# Patient Record
Sex: MALE | Race: WHITE | Employment: OTHER
[De-identification: names, ages, dates, MRNs, and addresses within clinical notes are randomized per-mention and may not be internally consistent; named-entity substitution may affect disease eponyms.]

---

## 2022-03-18 PROBLEM — R60.0 LOCALIZED EDEMA: Status: ACTIVE | Noted: 2021-04-30

## 2022-03-18 PROBLEM — E66.01 SEVERE OBESITY (HCC): Status: ACTIVE | Noted: 2020-01-03

## 2022-03-19 PROBLEM — N13.8 BPH WITH OBSTRUCTION/LOWER URINARY TRACT SYMPTOMS: Status: ACTIVE | Noted: 2021-07-16

## 2022-03-19 PROBLEM — H90.3 BILATERAL SENSORINEURAL HEARING LOSS: Status: ACTIVE | Noted: 2020-02-14

## 2022-03-19 PROBLEM — R36.1: Status: ACTIVE | Noted: 2021-07-16

## 2022-03-19 PROBLEM — L85.1 ACQUIRED KERATODERMA: Status: ACTIVE | Noted: 2020-01-20

## 2022-03-19 PROBLEM — N40.1 BPH WITH OBSTRUCTION/LOWER URINARY TRACT SYMPTOMS: Status: ACTIVE | Noted: 2021-07-16

## 2022-03-20 PROBLEM — N18.31 STAGE 3A CHRONIC KIDNEY DISEASE (HCC): Status: ACTIVE | Noted: 2020-02-14

## 2022-07-18 DIAGNOSIS — E78.00 PURE HYPERCHOLESTEROLEMIA, UNSPECIFIED: ICD-10-CM

## 2022-07-18 RX ORDER — ROSUVASTATIN CALCIUM 5 MG/1
TABLET, COATED ORAL
Qty: 90 TABLET | Refills: 0 | Status: SHIPPED | OUTPATIENT
Start: 2022-07-18 | End: 2022-08-04 | Stop reason: SDUPTHER

## 2022-07-22 ENCOUNTER — TELEPHONE (OUTPATIENT)
Dept: FAMILY MEDICINE CLINIC | Facility: CLINIC | Age: 72
End: 2022-07-22

## 2022-07-25 DIAGNOSIS — E78.00 HYPERCHOLESTEROLEMIA: Primary | ICD-10-CM

## 2022-07-29 ENCOUNTER — NURSE ONLY (OUTPATIENT)
Dept: FAMILY MEDICINE CLINIC | Facility: CLINIC | Age: 72
End: 2022-07-29

## 2022-07-29 DIAGNOSIS — E78.00 HYPERCHOLESTEROLEMIA: ICD-10-CM

## 2022-07-29 LAB
ALT SERPL-CCNC: 22 U/L (ref 12–65)
CHOLEST SERPL-MCNC: 95 MG/DL
HDLC SERPL-MCNC: 32 MG/DL (ref 40–60)
HDLC SERPL: 3 {RATIO}
LDLC SERPL CALC-MCNC: 43.6 MG/DL
TRIGL SERPL-MCNC: 97 MG/DL (ref 35–150)
VLDLC SERPL CALC-MCNC: 19.4 MG/DL (ref 6–23)

## 2022-08-04 ENCOUNTER — NURSE ONLY (OUTPATIENT)
Dept: FAMILY MEDICINE CLINIC | Facility: CLINIC | Age: 72
End: 2022-08-04

## 2022-08-04 ENCOUNTER — OFFICE VISIT (OUTPATIENT)
Dept: FAMILY MEDICINE CLINIC | Facility: CLINIC | Age: 72
End: 2022-08-04
Payer: COMMERCIAL

## 2022-08-04 VITALS
BODY MASS INDEX: 34.03 KG/M2 | RESPIRATION RATE: 18 BRPM | WEIGHT: 216.8 LBS | TEMPERATURE: 98.1 F | OXYGEN SATURATION: 97 % | HEART RATE: 63 BPM | SYSTOLIC BLOOD PRESSURE: 118 MMHG | HEIGHT: 67 IN | DIASTOLIC BLOOD PRESSURE: 69 MMHG

## 2022-08-04 DIAGNOSIS — R73.09 ELEVATED GLUCOSE: ICD-10-CM

## 2022-08-04 DIAGNOSIS — R11.0 NAUSEA: ICD-10-CM

## 2022-08-04 DIAGNOSIS — E66.09 CLASS 1 OBESITY DUE TO EXCESS CALORIES WITH SERIOUS COMORBIDITY AND BODY MASS INDEX (BMI) OF 33.0 TO 33.9 IN ADULT: ICD-10-CM

## 2022-08-04 DIAGNOSIS — E78.00 HYPERCHOLESTEROLEMIA: ICD-10-CM

## 2022-08-04 DIAGNOSIS — B35.6 TINEA CRURIS: Primary | ICD-10-CM

## 2022-08-04 PROCEDURE — 99214 OFFICE O/P EST MOD 30 MIN: CPT | Performed by: FAMILY MEDICINE

## 2022-08-04 PROCEDURE — 1123F ACP DISCUSS/DSCN MKR DOCD: CPT | Performed by: FAMILY MEDICINE

## 2022-08-04 RX ORDER — ROSUVASTATIN CALCIUM 5 MG/1
TABLET, COATED ORAL
Qty: 90 TABLET | Refills: 2 | Status: SHIPPED | OUTPATIENT
Start: 2022-08-04

## 2022-08-04 RX ORDER — NYSTATIN 100000 [USP'U]/G
POWDER TOPICAL
Qty: 60 G | Refills: 5 | Status: SHIPPED | OUTPATIENT
Start: 2022-08-04

## 2022-08-04 RX ORDER — FLUCONAZOLE 150 MG/1
TABLET ORAL
Qty: 7 TABLET | Refills: 0 | Status: SHIPPED | OUTPATIENT
Start: 2022-08-04

## 2022-08-04 NOTE — PROGRESS NOTES
[address] [facility] [address]  Phone: [phone] Fax [phone]  Kike Falcon. Houston Maurer M.D.  Rosa Quintero is a 71 y.o. male     HPI:  Patient is seen for Skin Problem (Itching issue, no rash, in groin area, going on for many years, )  Patient describes history of groin itching for 4 to 5 years. He has tried numerous OTC topical preparations without noted benefit. He has tried topical hydrocortisone as well as antibiotic without noted benefit. States that it will itch such that he will claw at it at times. Patient did come in fasting July 29 for lipid panel and ALT. He tolerated the Crestor 5 mg daily which she continues. Chart review does show that in April when he had fasting labs drawn his blood sugar at that time was 162. States that he has had a number of family members including his father and father's relatives with diabetes. 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 Has known sensorineural bilateral hearing loss. States the nausea is why he stopped working at age 47. ROS:  Denies any chest pain dyspnea diaphoresis or edema. No reported palpitations or tachycardia. No polydipsia or polyphagia or polyuria. No abdominal pain. No hematochezia melena diarrhea or constipation. No dysuria or hematuria. Denies any depression.      Allergies   Allergen Reactions    Penicillins Hives    Pravastatin Other (See Comments)     Leg cramps    Sulfa Antibiotics Other (See Comments)       Active Ambulatory Problems     Diagnosis Date Noted    Severe obesity ([address] 01/03/2020    Localized edema 04/30/2021    Tear of medial meniscus of right knee 01/19/2016    Bilateral sensorineural hearing loss 02/14/2020    DANY (obstructive sleep apnea) 03/31/2014    BPH with obstruction/lower urinary tract symptoms 07/16/2021    Hypercholesterolemia 03/31/2014 Cervical stenosis of spine     Acquired keratoderma 01/20/2020    Ejaculation blood 07/16/2021    Right knee pain 01/19/2016    Stage 3a chronic kidney disease ([address] 02/14/2020    Elevated glucose 08/04/2022    Tinea cruris 08/04/2022     Resolved Ambulatory Problems     Diagnosis Date Noted    No Resolved Ambulatory Problems     Past Medical History:   Diagnosis Date    Abnormal blood chemistry 2/23/2016    Chest discomfort 2/23/2016    Concussion 1984    Hyperlipidemia 2/23/2016    Obesity 2/23/2016        Past Surgical History:   Procedure Laterality Date    COLONOSCOPY  February 2011    Dr. Sohail Villa in [place]; reportedly normal    NEUROLOGICAL SURGERY  1987    Herniated disc    ORTHOPEDIC SURGERY Left 10/05/2021    Dr. Andrew Becerra; [employer]'s release    ORTHOPEDIC SURGERY Right surgery in 73, 74, 76, and 80    broken leg/fused ankle    TOTAL KNEE ARTHROPLASTY Left 03/03/2020    Dr. Bianca Barrios      Family History   Problem Relation Age of Onset    Diabetes Father     Heart Disease Father     Cancer Father         skin    Heart Disease Maternal Grandmother     Heart Disease Maternal Grandfather     Heart Disease Paternal Grandmother     Heart Disease Paternal Grandfather     Cancer Paternal Aunt         stomach       Social History     Tobacco Use    Smoking status: Never    Smokeless tobacco: Never   Substance Use Topics    Alcohol use: No    Drug use: No       Physical Exam:  Blood pressure 118/69, pulse 63, temperature 98.1 °F (36.7 °C), temperature source Temporal, resp. rate 18, height 5' 7.13\" (1.705 m), weight 216 lb 12.8 oz (98.3 kg), SpO2 97 %. Pleasant male in no apparent distress. Affect is appropriate. HEENT grossly normal.  Oral mucosa is moist and intact. Neck is fairly supple. No cervical lymphadenopathy or thyromegaly or nodularity. Lungs clear. No JVD or hepatojugular reflux. Cardiovascular regular S1-S2 without murmurs rubs or gallops. Radial pulses 2+.   Abdomen is soft, moderately obese, and nontender with positive bowel sounds. No masses palpated. No peripheral edema or cyanosis. Moves all extremities well. Intertriginous areas of the groin as well as the scrotum are erythematous and mild to moderately excoriated. Assessment and Plan:   Diagnosis Orders   1. Tinea cruris  nystatin (MYCOSTATIN) 697695 UNIT/GM powder    fluconazole (DIFLUCAN) 150 MG tablet      2. Elevated glucose  Hemoglobin A1C      3. Hypercholesterolemia  rosuvastatin (CRESTOR) 5 MG tablet      4. Nausea        5. Class 1 obesity due to excess calories with serious comorbidity and body mass index (BMI) of 33.0 to 33.9 in adult          Information on tinea cruris, hyperlipidemia, prediabetes, and low carbohydrate diet provided. With family history of diabetes and noted elevated glucose in April as well as recurrent tinea cruris, check A1c level today. Placed patient on oral Diflucan daily for 7 days and topical nystatin powder as needed with history of recurrent tinea cruris. Reviewed with patient his significant improvement of cholesterol on Crestor 5 mg daily which was refilled. He had come in for labs late last month fasting. Commended patient on his weight loss from previous with dietary changes reported. May need to consider referral back to ENT physician if worsening nausea associated with positional change such as lying on his back or bending over to work on cars. Reassess here next April fasting and for subsequent wellness exam as planned. Discharge Meds:  Current Outpatient Medications   Medication Sig Dispense Refill    rosuvastatin (CRESTOR) 5 MG tablet TAKE 1 TABLET BY MOUTH NIGHTLY 90 tablet 2    VITAMIN D PO Take by mouth      Cyanocobalamin (VITAMIN B12 PO) Take by mouth      nystatin (MYCOSTATIN) 396682 UNIT/GM powder Apply topically 4 times daily.  60 g 5    fluconazole (DIFLUCAN) 150 MG tablet 1 tab orally daily for 7 days 7 tablet 0    GARLIC PO Take by mouth      MAGNESIUM PO Take by mouth No current facility-administered medications for this visit. Return in about 9 months (around 4/24/2023) for fasting and sub wellness as planned. Any new medications were explained today including any potential drug interactions or significant side effects. The patient's questions in regards to this were answered today. Dictated using voice recognition software.   Proofread, but unrecognized errors may exist.

## 2022-08-05 LAB
EST. AVERAGE GLUCOSE BLD GHB EST-MCNC: 137 MG/DL
HBA1C MFR BLD: 6.4 % (ref 4.8–5.6)

## 2022-10-18 ENCOUNTER — TELEPHONE (OUTPATIENT)
Dept: FAMILY MEDICINE CLINIC | Facility: CLINIC | Age: 72
End: 2022-10-18

## 2022-10-18 NOTE — TELEPHONE ENCOUNTER
Pt called stating that he has had a cough 5-6 weeks now. Asking if something can be called into the pharmacy for him. Last OV 8/4/22.  Next OV 4/24/23

## 2023-04-24 ENCOUNTER — NURSE ONLY (OUTPATIENT)
Dept: FAMILY MEDICINE CLINIC | Facility: CLINIC | Age: 73
End: 2023-04-24

## 2023-04-24 ENCOUNTER — OFFICE VISIT (OUTPATIENT)
Dept: FAMILY MEDICINE CLINIC | Facility: CLINIC | Age: 73
End: 2023-04-24
Payer: COMMERCIAL

## 2023-04-24 VITALS
OXYGEN SATURATION: 95 % | TEMPERATURE: 97.2 F | WEIGHT: 225 LBS | DIASTOLIC BLOOD PRESSURE: 77 MMHG | HEART RATE: 59 BPM | RESPIRATION RATE: 18 BRPM | SYSTOLIC BLOOD PRESSURE: 132 MMHG | HEIGHT: 67 IN | BODY MASS INDEX: 35.31 KG/M2

## 2023-04-24 DIAGNOSIS — E78.00 HYPERCHOLESTEROLEMIA: ICD-10-CM

## 2023-04-24 DIAGNOSIS — N18.31 CHRONIC KIDNEY DISEASE, STAGE 3A (HCC): ICD-10-CM

## 2023-04-24 DIAGNOSIS — R63.4 WEIGHT LOSS: ICD-10-CM

## 2023-04-24 DIAGNOSIS — R60.0 LOCALIZED EDEMA: ICD-10-CM

## 2023-04-24 DIAGNOSIS — R32 URINARY INCONTINENCE, UNSPECIFIED TYPE: ICD-10-CM

## 2023-04-24 DIAGNOSIS — H90.3 BILATERAL SENSORINEURAL HEARING LOSS: ICD-10-CM

## 2023-04-24 DIAGNOSIS — R35.1 BENIGN PROSTATIC HYPERPLASIA WITH NOCTURIA: ICD-10-CM

## 2023-04-24 DIAGNOSIS — N40.1 BENIGN PROSTATIC HYPERPLASIA WITH NOCTURIA: ICD-10-CM

## 2023-04-24 DIAGNOSIS — R73.09 ELEVATED GLUCOSE: ICD-10-CM

## 2023-04-24 DIAGNOSIS — E66.01 CLASS 2 SEVERE OBESITY DUE TO EXCESS CALORIES WITH SERIOUS COMORBIDITY AND BODY MASS INDEX (BMI) OF 35.0 TO 35.9 IN ADULT (HCC): ICD-10-CM

## 2023-04-24 DIAGNOSIS — L29.1 SCROTAL ITCHING: ICD-10-CM

## 2023-04-24 DIAGNOSIS — Z00.00 MEDICARE ANNUAL WELLNESS VISIT, SUBSEQUENT: Primary | ICD-10-CM

## 2023-04-24 LAB
BASOPHILS # BLD: 0.1 K/UL (ref 0–0.2)
BASOPHILS NFR BLD: 1 % (ref 0–2)
DIFFERENTIAL METHOD BLD: ABNORMAL
EOSINOPHIL # BLD: 0.4 K/UL (ref 0–0.8)
EOSINOPHIL NFR BLD: 8 % (ref 0.5–7.8)
ERYTHROCYTE [DISTWIDTH] IN BLOOD BY AUTOMATED COUNT: 12.8 % (ref 11.9–14.6)
EST. AVERAGE GLUCOSE BLD GHB EST-MCNC: 289 MG/DL
HBA1C MFR BLD: 11.7 % (ref 4.8–5.6)
HCT VFR BLD AUTO: 47.6 % (ref 41.1–50.3)
HGB BLD-MCNC: 15.9 G/DL (ref 13.6–17.2)
IMM GRANULOCYTES # BLD AUTO: 0 K/UL (ref 0–0.5)
IMM GRANULOCYTES NFR BLD AUTO: 0 % (ref 0–5)
LYMPHOCYTES # BLD: 1.3 K/UL (ref 0.5–4.6)
LYMPHOCYTES NFR BLD: 25 % (ref 13–44)
MCH RBC QN AUTO: 30.1 PG (ref 26.1–32.9)
MCHC RBC AUTO-ENTMCNC: 33.4 G/DL (ref 31.4–35)
MCV RBC AUTO: 90.2 FL (ref 82–102)
MONOCYTES # BLD: 0.4 K/UL (ref 0.1–1.3)
MONOCYTES NFR BLD: 8 % (ref 4–12)
NEUTS SEG # BLD: 3 K/UL (ref 1.7–8.2)
NEUTS SEG NFR BLD: 58 % (ref 43–78)
NRBC # BLD: 0 K/UL (ref 0–0.2)
PLATELET # BLD AUTO: 223 K/UL (ref 150–450)
PMV BLD AUTO: 10.3 FL (ref 9.4–12.3)
RBC # BLD AUTO: 5.28 M/UL (ref 4.23–5.6)
WBC # BLD AUTO: 5.2 K/UL (ref 4.3–11.1)

## 2023-04-24 PROCEDURE — 1123F ACP DISCUSS/DSCN MKR DOCD: CPT | Performed by: FAMILY MEDICINE

## 2023-04-24 PROCEDURE — G0439 PPPS, SUBSEQ VISIT: HCPCS | Performed by: FAMILY MEDICINE

## 2023-04-24 PROCEDURE — 99214 OFFICE O/P EST MOD 30 MIN: CPT | Performed by: FAMILY MEDICINE

## 2023-04-24 RX ORDER — ROSUVASTATIN CALCIUM 5 MG/1
TABLET, COATED ORAL
Qty: 90 TABLET | Refills: 3 | Status: SHIPPED | OUTPATIENT
Start: 2023-04-24

## 2023-04-24 RX ORDER — TAMSULOSIN HYDROCHLORIDE 0.4 MG/1
0.4 CAPSULE ORAL DAILY
Qty: 90 CAPSULE | Refills: 1 | Status: SHIPPED | OUTPATIENT
Start: 2023-04-24

## 2023-04-24 SDOH — ECONOMIC STABILITY: FOOD INSECURITY: WITHIN THE PAST 12 MONTHS, THE FOOD YOU BOUGHT JUST DIDN'T LAST AND YOU DIDN'T HAVE MONEY TO GET MORE.: NEVER TRUE

## 2023-04-24 SDOH — ECONOMIC STABILITY: INCOME INSECURITY: HOW HARD IS IT FOR YOU TO PAY FOR THE VERY BASICS LIKE FOOD, HOUSING, MEDICAL CARE, AND HEATING?: NOT HARD AT ALL

## 2023-04-24 SDOH — ECONOMIC STABILITY: FOOD INSECURITY: WITHIN THE PAST 12 MONTHS, YOU WORRIED THAT YOUR FOOD WOULD RUN OUT BEFORE YOU GOT MONEY TO BUY MORE.: NEVER TRUE

## 2023-04-24 SDOH — ECONOMIC STABILITY: HOUSING INSECURITY
IN THE LAST 12 MONTHS, WAS THERE A TIME WHEN YOU DID NOT HAVE A STEADY PLACE TO SLEEP OR SLEPT IN A SHELTER (INCLUDING NOW)?: NO

## 2023-04-24 ASSESSMENT — PATIENT HEALTH QUESTIONNAIRE - PHQ9
1. LITTLE INTEREST OR PLEASURE IN DOING THINGS: 0
SUM OF ALL RESPONSES TO PHQ QUESTIONS 1-9: 0
SUM OF ALL RESPONSES TO PHQ9 QUESTIONS 1 & 2: 0
SUM OF ALL RESPONSES TO PHQ QUESTIONS 1-9: 0
2. FEELING DOWN, DEPRESSED OR HOPELESS: 0

## 2023-04-24 ASSESSMENT — LIFESTYLE VARIABLES
HOW OFTEN DO YOU HAVE A DRINK CONTAINING ALCOHOL: NEVER
HOW MANY STANDARD DRINKS CONTAINING ALCOHOL DO YOU HAVE ON A TYPICAL DAY: PATIENT DOES NOT DRINK

## 2023-04-24 NOTE — PROGRESS NOTES
Medicare Annual Wellness Visit    Eduin Varela is here for Medicare AWV (1 yr, fasting)    Assessment & Plan   Medicare annual wellness visit, subsequent  -     [facility] - Referral to ACP Clinical Specialist    Discussed with patient in detail Medicare subsequent annual wellness exam.  Wellness/anticipatory guidance information provided. Information on advanced directives discussed and printed. Immunizations up-to-date. Checking [facility] website for last shingles vaccine. COVID discussed. Patient was referred for ACP assistance. Patient encouraged to get eye exam and have information sent to our office. He has hearing aids which he is pleased. Recommendations for Preventive Services Due: see orders and patient instructions/AVS.  Recommended screening schedule for the next 5-10 years is provided to the patient in written form: see Patient Instructions/AVS.     Return in about 3 months (around 7/24/2023). Patient's complete Health Risk Assessment and screening values have been reviewed and are found in Flowsheets. The following problems were reviewed today and where indicated follow up appointments were made and/or referrals ordered. Positive Risk Factor Screenings with Interventions:                 Weight and Activity:  Physical Activity: Insufficiently Active    Days of Exercise per Week: 4 days    Minutes of Exercise per Session: 20 min     On average, how many days per week do you engage in moderate to strenuous exercise (like a brisk walk)?: 4 days  Have you lost any weight without trying in the past 3 months?: (!) Yes  Body mass index is 35.1 kg/m². (!) Abnormal    Unintentional Weight Loss Interventions:  See AVS for additional education material  Obesity Interventions:  Encouraged 150 minutes of low impact aerobic activity weekly. Also encouraged resistance training every other day with 24-48 hours of muscle glucose uptake subsequently.               Hearing Screen:  Do you or your family

## 2023-04-24 NOTE — PATIENT INSTRUCTIONS
Learning About Vision Tests  What are vision tests? The four most common vision tests are visual acuity tests, refraction, visual field tests, and color vision tests. Visual acuity (sharpness) tests  These tests are used: To see if you need glasses or contact lenses. To monitor an eye problem. To check an eye injury. Visual acuity tests are done as part of routine exams. You may also have this test when you get your 's license or apply for some types of jobs. Visual field tests  These tests are used: To check for vision loss in any area of your range of vision. To screen for certain eye diseases. To look for nerve damage after a stroke, head injury, or other problem that could reduce blood flow to the brain. Refraction and color tests  A refraction test is done to find the right prescription for glasses and contact lenses. A color vision test is done to check for color blindness. Color vision is often tested as part of a routine exam. You may also have this test when you apply for a job where recognizing different colors is important, such as , electronics, or the [employer]. How are vision tests done? Visual acuity test   You cover one eye at a time. You read aloud from a wall chart across the room. You read aloud from a small card that you hold in your hand. Refraction   You look into a special device. The device puts lenses of different strengths in front of each eye to see how strong your glasses or contact lenses need to be. Visual field tests   Your doctor may have you look through special machines. Or your doctor may simply have you stare straight ahead while they move a finger into and out of your field of vision. Color vision test   You look at pieces of printed test patterns in various colors. You say what number or symbol you see. Your doctor may have you trace the number or symbol using a pointer. How do these tests feel?   There is very little chance of

## 2023-04-25 ENCOUNTER — CLINICAL DOCUMENTATION (OUTPATIENT)
Dept: SPIRITUAL SERVICES | Age: 73
End: 2023-04-25

## 2023-04-25 DIAGNOSIS — E11.9 NEW ONSET TYPE 2 DIABETES MELLITUS (HCC): Primary | ICD-10-CM

## 2023-04-25 LAB
ALBUMIN SERPL-MCNC: 3.8 G/DL (ref 3.2–4.6)
ALBUMIN/GLOB SERPL: 1.1 (ref 0.4–1.6)
ALP SERPL-CCNC: 101 U/L (ref 50–136)
ALT SERPL-CCNC: 24 U/L (ref 12–65)
ANION GAP SERPL CALC-SCNC: 5 MMOL/L (ref 2–11)
AST SERPL-CCNC: 19 U/L (ref 15–37)
BILIRUB SERPL-MCNC: 0.5 MG/DL (ref 0.2–1.1)
BUN SERPL-MCNC: 18 MG/DL (ref 8–23)
CALCIUM SERPL-MCNC: 9.4 MG/DL (ref 8.3–10.4)
CHLORIDE SERPL-SCNC: 103 MMOL/L (ref 101–110)
CHOLEST SERPL-MCNC: 104 MG/DL
CO2 SERPL-SCNC: 27 MMOL/L (ref 21–32)
CREAT SERPL-MCNC: 1.5 MG/DL (ref 0.8–1.5)
GLOBULIN SER CALC-MCNC: 3.6 G/DL (ref 2.8–4.5)
GLUCOSE SERPL-MCNC: 299 MG/DL (ref 65–100)
HDLC SERPL-MCNC: 31 MG/DL (ref 40–60)
HDLC SERPL: 3.4
LDLC SERPL CALC-MCNC: 50.8 MG/DL
POTASSIUM SERPL-SCNC: 4.8 MMOL/L (ref 3.5–5.1)
PROT SERPL-MCNC: 7.4 G/DL (ref 6.3–8.2)
PSA SERPL-MCNC: 2.5 NG/ML
SODIUM SERPL-SCNC: 135 MMOL/L (ref 133–143)
TRIGL SERPL-MCNC: 111 MG/DL (ref 35–150)
TSH, 3RD GENERATION: 3.16 UIU/ML (ref 0.36–3.74)
VLDLC SERPL CALC-MCNC: 22.2 MG/DL (ref 6–23)

## 2023-04-25 RX ORDER — METFORMIN HYDROCHLORIDE 750 MG/1
750 TABLET, EXTENDED RELEASE ORAL
Qty: 30 TABLET | Refills: 3 | Status: SHIPPED | OUTPATIENT
Start: 2023-04-25

## 2023-04-25 NOTE — PROGRESS NOTES
Advance Care Planning   Ambulatory ACP Specialist Patient Outreach    Date:  4/25/2023    ACP Specialist:  Luiz Cordova    Outreach call to patient in follow-up to ACP Specialist referral from:  Drew Harrington MD    [x] PCP  [] Provider   [] Ambulatory Care Management [] Other     For:                  [x] Advance Directive Assistance              [] Complete Portable DNR order              [] Complete POST/POLST/MOST              [] Code Status Discussion             [] Discuss Goals of Care             [] Early ACP Decision-Making              [] Other (Specify)    Date Referral Received:  4/24/23    Next Step:   [x] ACP scheduled conversation  [] Outreach again in one week               [x] Email / Mail ACP Info Sheets  [x] Email / Mail Advance Directive   [] Closing referral.  Routing closure to referring provider/staff and to ACP Specialist . [] Closure letter mailed to patient with invitation to contact ACP Specialist if / when ready. [] Other (Specify here): Outreaches:         [x] 1st -  Date:  4/25/23                              Intervention:  [] Spoke with Patient   [x] Left Voice mail [] Email / Mail    [] [place]  [] Other [id number]) : Outcomes: Outreach phone call to the patient. Unable to reach, left voicemail message with call back information. Will attempt to follow up in one week. [x] 2nd -  Date:  4/27/23                              Intervention:  [x] Spoke with Patient  [] Left Voice mail [] Email / Mail    [] [place]  [] Other [id number]) : Outcomes:  Received call back from patient scheduling a telephone conversation with ACP Specialist Kiana Jimenez for Wednesday, 5/1723 at 1:00 p.m.                [] 3rd -  Date:                               Intervention:  [] Spoke with Patient   [] Left Voice mail [] Email / Mail    [] [place]  [] Other [id number]) :                       Outcomes:           []

## 2023-04-27 ENCOUNTER — CLINICAL DOCUMENTATION (OUTPATIENT)
Dept: SPIRITUAL SERVICES | Age: 73
End: 2023-04-27

## 2023-05-18 ENCOUNTER — CLINICAL DOCUMENTATION (OUTPATIENT)
Dept: CARE COORDINATION | Facility: CLINIC | Age: 73
End: 2023-05-18

## 2023-05-18 NOTE — ACP (ADVANCE CARE PLANNING)
resuscitate)? \" no       [x] Yes   [] No   Educated Patient / Sarita Light regarding differences between Advance Directives and portable DNR orders. Length of ACP Conversation in minutes:  50    Conversation Outcomes:  ACP discussion completed and New Advance Directive completed    Follow-up plan:    [] Schedule follow-up conversation to continue planning  [] Referred individual to Provider for additional questions/concerns   [] Advised patient/agent/surrogate to review completed ACP document and update if needed with changes in condition, patient preferences or care setting    [x] This note routed to one or more involved healthcare providers      Patient calls for ACP conversation. Discussed advance directives, early decision making, and goals of care. Patient stated he would be agreeable to ventilator use with good prognosis and short term use but would not wish for use with poor prognosis or for long term. He would not agree to use of feeding tube or CPR with underlying conditions. Explained [place] and [place] in their entirety. Patient given opportunity to have their questions answered to satisfaction. Patient stated he would complete both documents and reach out for assistance with upload to chart. If no contact with patient in a week will follow again by phone.

## 2023-06-02 ENCOUNTER — CLINICAL DOCUMENTATION (OUTPATIENT)
Dept: CARE COORDINATION | Facility: CLINIC | Age: 73
End: 2023-06-02

## 2023-06-02 NOTE — ACP (ADVANCE CARE PLANNING)
Advance Care Planning   Ambulatory ACP Specialist Patient Outreach    Date:  6/2/2023  ACP Specialist:  Cinthia Page    Outreach call to patient in follow-up to ACP Specialist referral from: Parth Cummins MD    [] PCP  [] Provider   [] Ambulatory Care Management [] Other for Reason:    [] Advance Directive Assistance  [] Code Status Discussion  [] Complete Portable DNR Order  [] Discuss Goals of Care  [] Complete POST/MOST  [] Early ACP Decision-Making  [] Other    Date Referral Received:4/24/2023    Today's Outreach:  [] First   [] Second  [] Third                               Third outreach made by []  phone  [] email []   [employer]     Intervention:  [] Spoke with Patient  [x] Left VM requesting return call      Outcome:Called patietn to follow up after ACP conversation. LVM requesting return call. If no response will follow again in one week. Next Step:   [] ACP scheduled conversation  [x] Outreach again in one week               [] Email / Mail ACP Info Sheets  [] Email / Mail Advance Directive            [] Close Referral. Routing closure to referring provider/staff and to ACP Specialist . [] Closure Letter mailed to Patient with Invitation to Contact ACP Specialist if/when ready.     Thank you for this referral.

## 2023-07-22 DIAGNOSIS — E11.9 NEW ONSET TYPE 2 DIABETES MELLITUS (HCC): ICD-10-CM

## 2023-07-24 ENCOUNTER — TELEPHONE (OUTPATIENT)
Dept: FAMILY MEDICINE CLINIC | Facility: CLINIC | Age: 73
End: 2023-07-24

## 2023-07-24 RX ORDER — METFORMIN HYDROCHLORIDE 750 MG/1
TABLET, EXTENDED RELEASE ORAL
Qty: 90 TABLET | Refills: 0 | Status: SHIPPED | OUTPATIENT
Start: 2023-07-24

## 2023-07-24 NOTE — TELEPHONE ENCOUNTER
Medication Refill Request      Name of Medication : Metformin      Strength of Medication: 750 XR       Directions:  Take 1 tablet by mouth daily with breakfast       30 day or 90 day supply: 30      Preferred Pharmacy: [facility]    Additional Information For Provider: Completely out of medication

## 2023-07-31 ENCOUNTER — OFFICE VISIT (OUTPATIENT)
Dept: FAMILY MEDICINE CLINIC | Facility: CLINIC | Age: 73
End: 2023-07-31
Payer: COMMERCIAL

## 2023-07-31 VITALS
HEIGHT: 67 IN | DIASTOLIC BLOOD PRESSURE: 89 MMHG | HEART RATE: 81 BPM | WEIGHT: 201.2 LBS | OXYGEN SATURATION: 94 % | TEMPERATURE: 97.3 F | SYSTOLIC BLOOD PRESSURE: 131 MMHG | RESPIRATION RATE: 18 BRPM | BODY MASS INDEX: 31.58 KG/M2

## 2023-07-31 DIAGNOSIS — N18.31 CHRONIC KIDNEY DISEASE, STAGE 3A (HCC): ICD-10-CM

## 2023-07-31 DIAGNOSIS — E11.22 TYPE 2 DIABETES MELLITUS WITH STAGE 3A CHRONIC KIDNEY DISEASE, WITHOUT LONG-TERM CURRENT USE OF INSULIN (HCC): Primary | ICD-10-CM

## 2023-07-31 DIAGNOSIS — E66.09 CLASS 1 OBESITY DUE TO EXCESS CALORIES WITH SERIOUS COMORBIDITY AND BODY MASS INDEX (BMI) OF 31.0 TO 31.9 IN ADULT: ICD-10-CM

## 2023-07-31 DIAGNOSIS — N18.31 TYPE 2 DIABETES MELLITUS WITH STAGE 3A CHRONIC KIDNEY DISEASE, WITHOUT LONG-TERM CURRENT USE OF INSULIN (HCC): Primary | ICD-10-CM

## 2023-07-31 LAB
BILIRUBIN, URINE, POC: NORMAL
BLOOD URINE, POC: NEGATIVE
GLUCOSE URINE, POC: NEGATIVE
KETONES, URINE, POC: NORMAL
LEUKOCYTE ESTERASE, URINE, POC: NEGATIVE
NITRITE, URINE, POC: NEGATIVE
PH, URINE, POC: 5 (ref 4.6–8)
PROTEIN,URINE, POC: NORMAL
SPECIFIC GRAVITY, URINE, POC: 1.01 (ref 1–1.03)
URINALYSIS CLARITY, POC: CLEAR
URINALYSIS COLOR, POC: YELLOW
UROBILINOGEN, POC: NORMAL

## 2023-07-31 PROCEDURE — 3046F HEMOGLOBIN A1C LEVEL >9.0%: CPT | Performed by: FAMILY MEDICINE

## 2023-07-31 PROCEDURE — 81003 URINALYSIS AUTO W/O SCOPE: CPT | Performed by: FAMILY MEDICINE

## 2023-07-31 PROCEDURE — 99214 OFFICE O/P EST MOD 30 MIN: CPT | Performed by: FAMILY MEDICINE

## 2023-07-31 PROCEDURE — 1123F ACP DISCUSS/DSCN MKR DOCD: CPT | Performed by: FAMILY MEDICINE

## 2023-07-31 RX ORDER — METFORMIN HYDROCHLORIDE 750 MG/1
750 TABLET, EXTENDED RELEASE ORAL
Qty: 90 TABLET | Refills: 2 | Status: SHIPPED | OUTPATIENT
Start: 2023-07-31

## 2023-07-31 RX ORDER — TRANDOLAPRIL TABLETS 1 MG/1
1 TABLET ORAL DAILY
Qty: 90 TABLET | Refills: 1 | Status: SHIPPED | OUTPATIENT
Start: 2023-07-31

## 2023-07-31 NOTE — PROGRESS NOTES
patient in regards to his dietary efforts. Continue the Glucophage XR with prescription sent to the pharmacy. Encouraged continued healthy diabetic diet and routine exercise. Discussed benefits of ACE inhibitor therapy to help protect the lining of the cardiac and renal vessels. Placed patient on Mavik 1 mg daily. Discussed prior notation for mild renal insufficiency, stage IIIa, in more detail. Discussed reasons for treating type 2 diabetes including macrovascular and microvascular events. Await nonfasting labs as above and reassess here in 3 months nonfasting. Discharge Meds:  Current Outpatient Medications   Medication Sig Dispense Refill    metFORMIN (GLUCOPHAGE-XR) 750 MG extended release tablet Take 1 tablet by mouth daily (with breakfast) 90 tablet 2    trandolapril (MAVIK) 1 MG tablet Take 1 tablet by mouth daily 90 tablet 1    rosuvastatin (CRESTOR) 5 MG tablet TAKE 1 TABLET BY MOUTH NIGHTLY 90 tablet 3    VITAMIN D PO Take by mouth      Cyanocobalamin (VITAMIN B12 PO) Take by mouth      GARLIC PO Take by mouth      MAGNESIUM PO Take by mouth       No current facility-administered medications for this visit. Return in about 3 months (around 11/2/2023) for follow up. Any new medications were explained today including any potential drug interactions or significant side effects. The patient's questions in regards to this were answered today. Dictated using voice recognition software.   Proofread, but unrecognized errors may exist.

## 2023-08-01 LAB
ANION GAP SERPL CALC-SCNC: 10 MMOL/L (ref 2–11)
BUN SERPL-MCNC: 28 MG/DL (ref 8–23)
CALCIUM SERPL-MCNC: 9.9 MG/DL (ref 8.3–10.4)
CHLORIDE SERPL-SCNC: 109 MMOL/L (ref 101–110)
CO2 SERPL-SCNC: 24 MMOL/L (ref 21–32)
CREAT SERPL-MCNC: 1.6 MG/DL (ref 0.8–1.5)
CREAT UR-MCNC: 169 MG/DL
EST. AVERAGE GLUCOSE BLD GHB EST-MCNC: 126 MG/DL
GLUCOSE SERPL-MCNC: 104 MG/DL (ref 65–100)
HBA1C MFR BLD: 6 % (ref 4.8–5.6)
MICROALBUMIN UR-MCNC: 1.31 MG/DL
MICROALBUMIN/CREAT UR-RTO: 8 MG/G (ref 0–30)
POTASSIUM SERPL-SCNC: 4.1 MMOL/L (ref 3.5–5.1)
SODIUM SERPL-SCNC: 143 MMOL/L (ref 133–143)

## 2024-06-14 DIAGNOSIS — E78.00 HYPERCHOLESTEROLEMIA: ICD-10-CM

## 2024-06-20 RX ORDER — ROSUVASTATIN CALCIUM 5 MG/1
TABLET, COATED ORAL
Qty: 90 TABLET | Refills: 0 | OUTPATIENT
Start: 2024-06-20

## 2024-06-21 DIAGNOSIS — N18.31 TYPE 2 DIABETES MELLITUS WITH STAGE 3A CHRONIC KIDNEY DISEASE, WITHOUT LONG-TERM CURRENT USE OF INSULIN (HCC): ICD-10-CM

## 2024-06-21 DIAGNOSIS — E11.22 TYPE 2 DIABETES MELLITUS WITH STAGE 3A CHRONIC KIDNEY DISEASE, WITHOUT LONG-TERM CURRENT USE OF INSULIN (HCC): ICD-10-CM

## 2024-06-21 RX ORDER — METFORMIN HYDROCHLORIDE 750 MG/1
750 TABLET, EXTENDED RELEASE ORAL
Qty: 90 TABLET | Refills: 2 | OUTPATIENT
Start: 2024-06-21

## 2024-07-07 DIAGNOSIS — N18.31 TYPE 2 DIABETES MELLITUS WITH STAGE 3A CHRONIC KIDNEY DISEASE, WITHOUT LONG-TERM CURRENT USE OF INSULIN (HCC): ICD-10-CM

## 2024-07-07 DIAGNOSIS — E11.22 TYPE 2 DIABETES MELLITUS WITH STAGE 3A CHRONIC KIDNEY DISEASE, WITHOUT LONG-TERM CURRENT USE OF INSULIN (HCC): ICD-10-CM

## 2024-07-08 RX ORDER — METFORMIN HYDROCHLORIDE 750 MG/1
750 TABLET, EXTENDED RELEASE ORAL
Qty: 90 TABLET | Refills: 2 | OUTPATIENT
Start: 2024-07-08